# Patient Record
Sex: MALE | Race: WHITE | URBAN - METROPOLITAN AREA
[De-identification: names, ages, dates, MRNs, and addresses within clinical notes are randomized per-mention and may not be internally consistent; named-entity substitution may affect disease eponyms.]

---

## 2017-05-16 ENCOUNTER — TEAM CONFERENCE (OUTPATIENT)
Dept: OTHER | Facility: OUTPATIENT CENTER | Age: 42
End: 2017-05-16

## 2017-05-16 NOTE — TELEPHONE ENCOUNTER
Case Summary Record    Name:  August Morin  Diagnosis:    Adjustment disorder with mixed anxiety and depressed mood   Unspecified sexual dysfunction not due to a substance or known physiological condition            Date of Summary:  5/16/2017   Date of Initial [facility] Appointment:  10/6/2016  Date of Last [facility] Appointment:  12/01/2016    Evaluation of Client Status:    Total number of Individual, conjoint, group sessions patient attended:  4 individual sessions    Goals at intake were:   Prepare for ongoing therapy    At the time of discharge, the client reported the following progress:  Increased insight    Therapist Assessment:  % of goals met:  10 %  %of symptoms reversal: 10 %  % treatment effectiveness: 50 %. Client discontinued treatment without notice. Client was recommended to bring client in for diagnostic assessment, did not occur.     Prognosis:    Modest    Referred to:  No referral given.     Electronically Signed by:  Divina Salcdio PsyD Postdoctoral Fellow

## 2019-11-08 ENCOUNTER — HEALTH MAINTENANCE LETTER (OUTPATIENT)
Age: 44
End: 2019-11-08

## 2020-12-06 ENCOUNTER — HEALTH MAINTENANCE LETTER (OUTPATIENT)
Age: 45
End: 2020-12-06

## 2021-09-25 ENCOUNTER — HEALTH MAINTENANCE LETTER (OUTPATIENT)
Age: 46
End: 2021-09-25

## 2022-01-15 ENCOUNTER — HEALTH MAINTENANCE LETTER (OUTPATIENT)
Age: 47
End: 2022-01-15

## 2023-01-07 ENCOUNTER — HEALTH MAINTENANCE LETTER (OUTPATIENT)
Age: 48
End: 2023-01-07

## 2023-04-22 ENCOUNTER — HEALTH MAINTENANCE LETTER (OUTPATIENT)
Age: 48
End: 2023-04-22